# Patient Record
Sex: FEMALE | Race: WHITE | NOT HISPANIC OR LATINO | Employment: OTHER | URBAN - METROPOLITAN AREA
[De-identification: names, ages, dates, MRNs, and addresses within clinical notes are randomized per-mention and may not be internally consistent; named-entity substitution may affect disease eponyms.]

---

## 2022-05-27 ENCOUNTER — OFFICE VISIT (OUTPATIENT)
Dept: MEDICAL GROUP | Facility: MEDICAL CENTER | Age: 67
End: 2022-05-27
Attending: NURSE PRACTITIONER
Payer: MEDICAID

## 2022-05-27 DIAGNOSIS — Z12.11 COLON CANCER SCREENING: ICD-10-CM

## 2022-05-27 DIAGNOSIS — Z13.0 SCREENING FOR ENDOCRINE, NUTRITIONAL, METABOLIC AND IMMUNITY DISORDER: ICD-10-CM

## 2022-05-27 DIAGNOSIS — Z13.228 SCREENING FOR ENDOCRINE, NUTRITIONAL, METABOLIC AND IMMUNITY DISORDER: ICD-10-CM

## 2022-05-27 DIAGNOSIS — Z76.89 ENCOUNTER TO ESTABLISH CARE: ICD-10-CM

## 2022-05-27 DIAGNOSIS — Z12.31 ENCOUNTER FOR SCREENING MAMMOGRAM FOR MALIGNANT NEOPLASM OF BREAST: ICD-10-CM

## 2022-05-27 DIAGNOSIS — Z13.29 SCREENING FOR ENDOCRINE, NUTRITIONAL, METABOLIC AND IMMUNITY DISORDER: ICD-10-CM

## 2022-05-27 DIAGNOSIS — L29.9 ITCHING: ICD-10-CM

## 2022-05-27 DIAGNOSIS — I10 PRIMARY HYPERTENSION: ICD-10-CM

## 2022-05-27 DIAGNOSIS — Z13.21 SCREENING FOR ENDOCRINE, NUTRITIONAL, METABOLIC AND IMMUNITY DISORDER: ICD-10-CM

## 2022-05-27 DIAGNOSIS — F41.9 ANXIETY: ICD-10-CM

## 2022-05-27 PROCEDURE — 99204 OFFICE O/P NEW MOD 45 MIN: CPT | Performed by: NURSE PRACTITIONER

## 2022-05-27 PROCEDURE — 99213 OFFICE O/P EST LOW 20 MIN: CPT | Performed by: NURSE PRACTITIONER

## 2022-05-27 RX ORDER — PAROXETINE HYDROCHLORIDE 20 MG/1
20 TABLET, FILM COATED ORAL DAILY
Qty: 30 TABLET | Refills: 1 | Status: SHIPPED | OUTPATIENT
Start: 2022-05-27 | End: 2022-06-06

## 2022-05-27 RX ORDER — LEVOTHYROXINE SODIUM 0.07 MG/1
37.5 TABLET ORAL
COMMUNITY
End: 2022-06-06 | Stop reason: SDUPTHER

## 2022-05-27 RX ORDER — LISINOPRIL 10 MG/1
20 TABLET ORAL DAILY
Qty: 30 TABLET | Refills: 1 | Status: SHIPPED | OUTPATIENT
Start: 2022-05-27 | End: 2022-06-06

## 2022-05-27 ASSESSMENT — PATIENT HEALTH QUESTIONNAIRE - PHQ9: CLINICAL INTERPRETATION OF PHQ2 SCORE: 0

## 2022-05-27 NOTE — LETTER
Count includes the [facility]  CECILIO Iglesias.  [address]  [place] [address]  Fax: [phone]   Authorization for Release/Disclosure of   Protected Health Information   Name: RITA FIGUEROA : 1955 SSN: [id number]   Address: [address] [place] [id number] Phone:    [phone] (home)    I authorize the entity listed below to release/disclose the PHI below to:   Count includes the [facility]/NESTOR Iglesias and NESTOR Iglesias   Provider or Entity Name:  Dari Bolanos   [facility], [facility], Zip  [address] [place] [address]  Phone:  [phone]    Fax:     Reason for request: continuity of care   Information to be released:    [  ] LAST COLONOSCOPY,  including any PATH REPORT and follow-up  [  ] LAST FIT/COLOGUARD RESULT [  ] LAST DEXA  [  ] LAST MAMMOGRAM  [  ] LAST PAP  [  ] LAST LABS [  ] RETINA EXAM REPORT  [  ] IMMUNIZATION RECORDS  [ xx ] Release all info      [  ] Check here and initial the line next to each item to release ALL health information INCLUDING  _____ Care and treatment for drug and / or alcohol abuse  _____ HIV testing, infection status, or AIDS  _____ Genetic Testing    DATES OF SERVICE OR TIME PERIOD TO BE DISCLOSED: _____________  I understand and acknowledge that:  * This Authorization may be revoked at any time by you in writing, except if your health information has already been used or disclosed.  * Your health information that will be used or disclosed as a result of you signing this authorization could be re-disclosed by the recipient. If this occurs, your re-disclosed health information may no longer be protected by State or Federal laws.  * You may refuse to sign this Authorization. Your refusal will not affect your ability to obtain treatment.  * This Authorization becomes effective upon signing and will  on (date) __________.      If no date is indicated, this Authorization will  one (1) year from the  signature date.    Name: Macarena Walker    Signature:   Date:     5/27/2022       PLEASE FAX REQUESTED RECORDS BACK TO: [phone]

## 2022-05-28 NOTE — ASSESSMENT & PLAN NOTE
Chronic- uncontrolled   Re-starting Lisinopril 20mg po daily  Will have patient keep log at home when her cuff arrives  Will follow up with me in 1 week to recheck blood pressure and see if we need to adjust medications or send to pharmacotherapy.

## 2022-05-28 NOTE — ASSESSMENT & PLAN NOTE
Discussed health history and maintenance   Flu vaccine - Not available   Colon Ca screening - Referral to GI for Colonoscopy  Mammogram- Ordered   Pap smear - Done within last 3 years   STD Screening- Declined   Preventative screening labs ordered - will follow up with me in a few weeks

## 2022-05-28 NOTE — PROGRESS NOTES
"Chief Complaint   Patient presents with   • Establish Care       Subjective:     HPI:   Macarena Walker is a 66 y.o. female here to discuss the evaluation and management of:        Problem   Primary Hypertension    Diagnosed several years ago, was previously on Lisinopril but had been off for a few months and now blood pressure is elevated in clinic. Is having a blood pressure cuff sent to her home.      Encounter to Establish Care    Patient here to establish care. Was previously living in [facility], then moved to [place] for 3 months, and is now here. Has been off her medications for several months and here to re-establish.           ROS  See HPI       Allergies   Allergen Reactions   • Latex Rash     Rash turns into sore if not removed   • Other Misc Rash     \"Cloth band aid's\" Pt states if it isn't removed right away it will turn into a sore.    • Morphine Hives, Rash and Itching     Pt does not know the severity of the reaction due to not being awake during reaction.   • Darvon [Propoxyphene]      Nausea, HA's       Current medicines (including changes today)  Current Outpatient Medications   Medication Sig Dispense Refill   • levothyroxine (SYNTHROID) 75 MCG Tab Take 37.5 mcg by mouth every morning on an empty stomach.     • lisinopril (PRINIVIL) 10 MG Tab Take 2 Tablets by mouth every day. 30 Tablet 1   • PARoxetine (PAXIL) 20 MG Tab Take 1 Tablet by mouth every day. 30 Tablet 1   • nystatin/triamcinolone (MYCOLOG) 830869-8.1 UNIT/GM-% Cream Apply 1 Application topically 3 times a day as needed (itching/ yeast). 30 g 0     No current facility-administered medications for this visit.       Social History     Tobacco Use   • Smoking status: Former Smoker     Years: 20.00     Types: Cigarettes     Quit date: 10/10/1996     Years since quittin.6   • Smokeless tobacco: Never Used   Vaping Use   • Vaping Use: Every day   • Substances: THC, Flavoring   • Devices: Disposable   Substance Use " Topics   • Alcohol use: Yes     Comment: Rarely   • Drug use: Yes     Types: Marijuana, Inhaled, Oral     Comment: Pt states that she uses this for her RA. CS05/27/2022       Patient Active Problem List    Diagnosis Date Noted   • Primary hypertension 05/27/2022   • Encounter to establish care 05/27/2022       Family History   Problem Relation Age of Onset   • Cancer Father    • Cancer Maternal Grandfather           Objective:     There were no vitals taken for this visit. There is no height or weight on file to calculate BMI.    Physical Exam:  Physical Exam  Vitals reviewed.   Constitutional:       General: She is awake.      Appearance: Normal appearance. She is well-developed.   HENT:      Head: Normocephalic.   Eyes:      Conjunctiva/sclera: Conjunctivae normal.   Cardiovascular:      Rate and Rhythm: Normal rate.   Pulmonary:      Effort: Pulmonary effort is normal. No respiratory distress.   Musculoskeletal:      Cervical back: Neck supple.   Skin:     General: Skin is warm and dry.   Neurological:      Mental Status: She is alert and oriented to person, place, and time.   Psychiatric:         Mood and Affect: Mood normal.         Behavior: Behavior normal. Behavior is cooperative.         Assessment and Plan:     The following treatment plan was discussed:    Problem List Items Addressed This Visit     Primary hypertension     Chronic- uncontrolled   Re-starting Lisinopril 20mg po daily  Will have patient keep log at home when her cuff arrives  Will follow up with me in 1 week to recheck blood pressure and see if we need to adjust medications or send to pharmacotherapy.              Relevant Medications    lisinopril (PRINIVIL) 10 MG Tab    Encounter to establish care     Discussed health history and maintenance   Flu vaccine - Not available   Colon Ca screening - Referral to GI for Colonoscopy  Mammogram- Ordered   Pap smear - Done within last 3 years   STD Screening- Declined   Preventative screening labs  ordered - will follow up with me in a few weeks                Other Visit Diagnoses     Screening for endocrine, nutritional, metabolic and immunity disorder        Relevant Orders    Lipid Profile    HEMOGLOBIN A1C    Comp Metabolic Panel    FREE THYROXINE    TSH    VITAMIN D,25 HYDROXY    HEP C VIRUS ANTIBODY    CBC WITH DIFFERENTIAL    VITAMIN B12    FERRITIN    FOLATE    Encounter for screening mammogram for malignant neoplasm of breast        Relevant Orders    MA-SCREENING MAMMO BILAT W/TOMOSYNTHESIS W/CAD    Colon cancer screening        Relevant Orders    Referral to GI for Colonoscopy    Anxiety        Relevant Medications    PARoxetine (PAXIL) 20 MG Tab    Itching        Relevant Medications    nystatin/triamcinolone (MYCOLOG) 747760-6.1 UNIT/GM-% Cream          Any change or worsening of signs or symptoms, patient encouraged to follow-up or report to emergency room for further evaluation. Patient verbalizes understanding and agrees.    Follow-Up: Return in about 1 week (around 6/3/2022).      PLEASE NOTE: This dictation was created using voice recognition software. I have made every reasonable attempt to correct obvious errors, but I expect that there are errors of grammar and possibly content that I did not discover before finalizing the note.

## 2022-06-02 ENCOUNTER — HOSPITAL ENCOUNTER (OUTPATIENT)
Dept: LAB | Facility: MEDICAL CENTER | Age: 67
End: 2022-06-02
Attending: NURSE PRACTITIONER
Payer: MEDICAID

## 2022-06-02 DIAGNOSIS — Z13.0 SCREENING FOR ENDOCRINE, NUTRITIONAL, METABOLIC AND IMMUNITY DISORDER: ICD-10-CM

## 2022-06-02 DIAGNOSIS — Z13.21 SCREENING FOR ENDOCRINE, NUTRITIONAL, METABOLIC AND IMMUNITY DISORDER: ICD-10-CM

## 2022-06-02 DIAGNOSIS — Z13.29 SCREENING FOR ENDOCRINE, NUTRITIONAL, METABOLIC AND IMMUNITY DISORDER: ICD-10-CM

## 2022-06-02 DIAGNOSIS — Z13.228 SCREENING FOR ENDOCRINE, NUTRITIONAL, METABOLIC AND IMMUNITY DISORDER: ICD-10-CM

## 2022-06-02 LAB
25(OH)D3 SERPL-MCNC: 31 NG/ML (ref 30–100)
ALBUMIN SERPL BCP-MCNC: 4.1 G/DL (ref 3.2–4.9)
ALBUMIN/GLOB SERPL: 1.5 G/DL
ALP SERPL-CCNC: 57 U/L (ref 30–99)
ALT SERPL-CCNC: 11 U/L (ref 2–50)
ANION GAP SERPL CALC-SCNC: 11 MMOL/L (ref 7–16)
AST SERPL-CCNC: 7 U/L (ref 12–45)
BASOPHILS # BLD AUTO: 0.7 % (ref 0–1.8)
BASOPHILS # BLD: 0.05 K/UL (ref 0–0.12)
BILIRUB SERPL-MCNC: 0.3 MG/DL (ref 0.1–1.5)
BUN SERPL-MCNC: 13 MG/DL (ref 8–22)
CALCIUM SERPL-MCNC: 9.3 MG/DL (ref 8.5–10.5)
CHLORIDE SERPL-SCNC: 104 MMOL/L (ref 96–112)
CHOLEST SERPL-MCNC: 197 MG/DL (ref 100–199)
CO2 SERPL-SCNC: 25 MMOL/L (ref 20–33)
CREAT SERPL-MCNC: 0.74 MG/DL (ref 0.5–1.4)
EOSINOPHIL # BLD AUTO: 0.2 K/UL (ref 0–0.51)
EOSINOPHIL NFR BLD: 2.7 % (ref 0–6.9)
ERYTHROCYTE [DISTWIDTH] IN BLOOD BY AUTOMATED COUNT: 43.1 FL (ref 35.9–50)
EST. AVERAGE GLUCOSE BLD GHB EST-MCNC: 114 MG/DL
FASTING STATUS PATIENT QL REPORTED: NORMAL
FERRITIN SERPL-MCNC: 36.2 NG/ML (ref 10–291)
FOLATE SERPL-MCNC: 12.1 NG/ML
GFR SERPLBLD CREATININE-BSD FMLA CKD-EPI: 90 ML/MIN/1.73 M 2
GLOBULIN SER CALC-MCNC: 2.7 G/DL (ref 1.9–3.5)
GLUCOSE SERPL-MCNC: 120 MG/DL (ref 65–99)
HBA1C MFR BLD: 5.6 % (ref 4–5.6)
HCT VFR BLD AUTO: 45.8 % (ref 37–47)
HCV AB SER QL: REACTIVE
HDLC SERPL-MCNC: 38 MG/DL
HGB BLD-MCNC: 15.5 G/DL (ref 12–16)
IMM GRANULOCYTES # BLD AUTO: 0.02 K/UL (ref 0–0.11)
IMM GRANULOCYTES NFR BLD AUTO: 0.3 % (ref 0–0.9)
LDLC SERPL CALC-MCNC: 129 MG/DL
LYMPHOCYTES # BLD AUTO: 2.63 K/UL (ref 1–4.8)
LYMPHOCYTES NFR BLD: 35 % (ref 22–41)
MCH RBC QN AUTO: 30.4 PG (ref 27–33)
MCHC RBC AUTO-ENTMCNC: 33.8 G/DL (ref 33.6–35)
MCV RBC AUTO: 89.8 FL (ref 81.4–97.8)
MONOCYTES # BLD AUTO: 0.46 K/UL (ref 0–0.85)
MONOCYTES NFR BLD AUTO: 6.1 % (ref 0–13.4)
NEUTROPHILS # BLD AUTO: 4.16 K/UL (ref 2–7.15)
NEUTROPHILS NFR BLD: 55.2 % (ref 44–72)
NRBC # BLD AUTO: 0 K/UL
NRBC BLD-RTO: 0 /100 WBC
PLATELET # BLD AUTO: 326 K/UL (ref 164–446)
PMV BLD AUTO: 9.4 FL (ref 9–12.9)
POTASSIUM SERPL-SCNC: 4.1 MMOL/L (ref 3.6–5.5)
PROT SERPL-MCNC: 6.8 G/DL (ref 6–8.2)
RBC # BLD AUTO: 5.1 M/UL (ref 4.2–5.4)
SODIUM SERPL-SCNC: 140 MMOL/L (ref 135–145)
T4 FREE SERPL-MCNC: 1.16 NG/DL (ref 0.93–1.7)
TRIGL SERPL-MCNC: 151 MG/DL (ref 0–149)
TSH SERPL DL<=0.005 MIU/L-ACNC: 1.62 UIU/ML (ref 0.38–5.33)
VIT B12 SERPL-MCNC: 511 PG/ML (ref 211–911)
WBC # BLD AUTO: 7.5 K/UL (ref 4.8–10.8)

## 2022-06-02 PROCEDURE — 84439 ASSAY OF FREE THYROXINE: CPT

## 2022-06-02 PROCEDURE — 82306 VITAMIN D 25 HYDROXY: CPT

## 2022-06-02 PROCEDURE — 85025 COMPLETE CBC W/AUTO DIFF WBC: CPT

## 2022-06-02 PROCEDURE — 80061 LIPID PANEL: CPT

## 2022-06-02 PROCEDURE — 83036 HEMOGLOBIN GLYCOSYLATED A1C: CPT

## 2022-06-02 PROCEDURE — 84443 ASSAY THYROID STIM HORMONE: CPT

## 2022-06-02 PROCEDURE — 36415 COLL VENOUS BLD VENIPUNCTURE: CPT

## 2022-06-02 PROCEDURE — 87522 HEPATITIS C REVRS TRNSCRPJ: CPT

## 2022-06-02 PROCEDURE — 82746 ASSAY OF FOLIC ACID SERUM: CPT

## 2022-06-02 PROCEDURE — 80053 COMPREHEN METABOLIC PANEL: CPT

## 2022-06-02 PROCEDURE — 86803 HEPATITIS C AB TEST: CPT

## 2022-06-02 PROCEDURE — 82607 VITAMIN B-12: CPT

## 2022-06-02 PROCEDURE — 82728 ASSAY OF FERRITIN: CPT

## 2022-06-04 LAB
HCV RNA SERPL NAA+PROBE-ACNC: NOT DETECTED IU/ML
HCV RNA SERPL NAA+PROBE-LOG IU: NOT DETECTED LOG IU/ML
HCV RNA SERPL QL NAA+PROBE: NOT DETECTED

## 2022-06-06 ENCOUNTER — OFFICE VISIT (OUTPATIENT)
Dept: MEDICAL GROUP | Facility: MEDICAL CENTER | Age: 67
End: 2022-06-06
Attending: NURSE PRACTITIONER
Payer: MEDICAID

## 2022-06-06 VITALS
HEIGHT: 62 IN | RESPIRATION RATE: 16 BRPM | WEIGHT: 250 LBS | TEMPERATURE: 96.6 F | OXYGEN SATURATION: 95 % | HEART RATE: 68 BPM | BODY MASS INDEX: 46.01 KG/M2 | DIASTOLIC BLOOD PRESSURE: 94 MMHG | SYSTOLIC BLOOD PRESSURE: 148 MMHG

## 2022-06-06 DIAGNOSIS — I10 PRIMARY HYPERTENSION: ICD-10-CM

## 2022-06-06 DIAGNOSIS — L91.8 CUTANEOUS SKIN TAGS: ICD-10-CM

## 2022-06-06 DIAGNOSIS — E03.9 HYPOTHYROIDISM, UNSPECIFIED TYPE: ICD-10-CM

## 2022-06-06 DIAGNOSIS — F41.1 ANXIETY STATE: ICD-10-CM

## 2022-06-06 PROCEDURE — 99213 OFFICE O/P EST LOW 20 MIN: CPT | Mod: 25 | Performed by: NURSE PRACTITIONER

## 2022-06-06 PROCEDURE — 99213 OFFICE O/P EST LOW 20 MIN: CPT | Performed by: NURSE PRACTITIONER

## 2022-06-06 PROCEDURE — 17110 DESTRUCTION B9 LES UP TO 14: CPT | Performed by: NURSE PRACTITIONER

## 2022-06-06 RX ORDER — ESCITALOPRAM OXALATE 10 MG/1
10 TABLET ORAL DAILY
Qty: 30 TABLET | Refills: 1 | Status: SHIPPED | OUTPATIENT
Start: 2022-06-06 | End: 2022-07-19 | Stop reason: SDUPTHER

## 2022-06-06 RX ORDER — LISINOPRIL 30 MG/1
30 TABLET ORAL DAILY
Qty: 30 TABLET | Refills: 1 | Status: SHIPPED | OUTPATIENT
Start: 2022-06-06 | End: 2022-07-19 | Stop reason: SDUPTHER

## 2022-06-06 RX ORDER — LEVOTHYROXINE SODIUM 0.07 MG/1
37.5 TABLET ORAL
Qty: 30 TABLET | Refills: 1 | Status: SHIPPED | OUTPATIENT
Start: 2022-06-06 | End: 2022-07-19 | Stop reason: SDUPTHER

## 2022-06-06 ASSESSMENT — FIBROSIS 4 INDEX: FIB4 SCORE: 0.41

## 2022-06-06 NOTE — PROGRESS NOTES
"Chief Complaint   Patient presents with   • Follow-Up     Blood pressure, Labs       Subjective:     HPI:   Macarena Walker is a 66 y.o. female here to discuss the evaluation and management of:      Problem   Cutaneous Skin Tags    Patient states she has had trouble with skin tags for prolonged amount of time.  Has previously had them removed with dermatology.  Patient has approximately 3 skin tags in the area of her right groin on the upper portion of her thigh that are causing her issues and she would like to see if they can be removed.  Patient states she is okay with cryotherapy as she has had it done previously in the past.     Primary Hypertension    Patient's blood pressure continues to be slightly elevated on 20 mg of lisinopril.  Patient states she has been taking it daily and is about to run out of her medication.      Hypothyroidism    Patient's recent lab work showed that she is stable on her current dose of 37.5 mcg.      Anxiety State    Patient states that her Paxil is no longer working.  Has been on it a few years and seems to not be doing the job anymore.  Patient is interested in switching medications.  Patient's niece was recently started on Lexapro and she feels that may be a better choice for her as it is working well for her niece.  Patient continues to have pretty severe anxiety and had to force herself to even make it to this appointment this morning secondary to her anxiety.  Patient states it is getting harder and harder for her to leave the house and now that she has a new roommate is getting harder to deal with things at home.            ROS  See HPI     Allergies   Allergen Reactions   • Latex Rash     Rash turns into sore if not removed   • Other Misc Rash     \"Cloth band aid's\" Pt states if it isn't removed right away it will turn into a sore.    • Morphine Hives, Rash and Itching     Pt does not know the severity of the reaction due to not being awake during reaction.   • Darvon " [Propoxyphene]      Nausea, HA's       Current medicines (including changes today)  Current Outpatient Medications   Medication Sig Dispense Refill   • escitalopram (LEXAPRO) 10 MG Tab Take 1 Tablet by mouth every day. 30 Tablet 1   • lisinopril (PRINIVIL) 30 MG tablet Take 1 Tablet by mouth every day. 30 Tablet 1   • levothyroxine (SYNTHROID) 75 MCG Tab Take 0.5 Tablets by mouth every morning on an empty stomach. 30 Tablet 1   • nystatin/triamcinolone (MYCOLOG) 215734-6.1 UNIT/GM-% Cream Apply 1 Application topically 3 times a day as needed (itching/ yeast). 30 g 0     No current facility-administered medications for this visit.       Social History     Tobacco Use   • Smoking status: Former Smoker     Years: 20.00     Types: Cigarettes     Quit date: 10/10/1996     Years since quittin.6   • Smokeless tobacco: Never Used   Vaping Use   • Vaping Use: Every day   • Substances: THC, Flavoring   • Devices: Disposable   Substance Use Topics   • Alcohol use: Yes     Comment: Rarely   • Drug use: Yes     Types: Marijuana, Inhaled, Oral     Comment: Pt states that she uses this for her RA. CS2022       Patient Active Problem List    Diagnosis Date Noted   • Cutaneous skin tags 2022   • Primary hypertension 2022   • Encounter to establish care 2022   • Marijuana use 2016   • Hepatitis C infection 2015   • Hypothyroidism 2015   • Carpal tunnel syndrome 10/29/2014   • Elevated liver enzymes 10/01/2014   • Peripheral neuropathy 2014   • Candida infection 2014   • Anxiety state 2014   • Rheumatoid arthritis (HCC) 2014   • Hepatitis C virus carrier state (HCC) 2014   • Hyperlipidemia 2014   • Impaired fasting glucose 2014   • Obesity 2014       Family History   Problem Relation Age of Onset   • Cancer Father    • Cancer Maternal Grandfather           Objective:     BP (!) 148/94 (BP Location: Left arm, Patient Position: Sitting, BP  "Cuff Size: Adult)   Pulse 68   Temp 35.9 °C (96.6 °F) (Temporal)   Resp 16   Ht 1.575 m (5' 2\")   Wt 113 kg (250 lb)   SpO2 95%  Body mass index is 45.73 kg/m².    Physical Exam:  Physical Exam  Vitals reviewed.   Constitutional:       General: She is awake.      Appearance: Normal appearance. She is well-developed. She is obese.   HENT:      Head: Normocephalic.   Eyes:      Conjunctiva/sclera: Conjunctivae normal.   Cardiovascular:      Rate and Rhythm: Normal rate.   Pulmonary:      Effort: Pulmonary effort is normal. No respiratory distress.   Genitourinary:         Comments: Three skin tags treated with Cryotherapy   Musculoskeletal:      Cervical back: Neck supple.   Skin:     General: Skin is warm and dry.   Neurological:      Mental Status: She is alert and oriented to person, place, and time.   Psychiatric:         Mood and Affect: Mood normal.         Behavior: Behavior normal. Behavior is cooperative.              Assessment and Plan:     The following treatment plan was discussed:    Problem List Items Addressed This Visit     Primary hypertension     Ongoing uncontrolled-  Will increase lisinopril to 30 mg daily  Encourage patient to take blood pressure at home which she has a cuff.  We will follow-up with me at her next appointment to adjust medication as needed           Relevant Medications    lisinopril (PRINIVIL) 30 MG tablet    Anxiety state     Chronic, uncontrolled-  Will discontinue Paxil  Initiate Lexapro 10 mg tabs, discussed with patient potential side effects especially in the first few weeks and to allow the medication 6 weeks to be therapeutic.  Patient will follow up with me in 6 weeks to see if dose adjustment is needed.  Referral to psychiatry also placed to help with coping mechanisms to help deal with her anxiety.           Relevant Medications    escitalopram (LEXAPRO) 10 MG Tab    Other Relevant Orders    Referral to Psychiatry    Hypothyroidism     Chronic, controlled-  Will " continue with 37.5 mcg each morning, refill of her medication sent to pharmacy           Relevant Medications    levothyroxine (SYNTHROID) 75 MCG Tab    Cutaneous skin tags     Chronic, uncontrolled-  3 separate areas treated with cryotherapy x3 passes  4 x 4 and Tegaderm placed over to avoid rubbing and irritation  Explained to patient that it may take more than 1 session to have skin tags fully removed  Can plan for removal of skin tags in office if cryotherapy is not effective.  Referral to dermatology placed as patient has another, lump that appears to be a lipoma on her opposite side.           Relevant Orders    Referral to Dermatology          Any change or worsening of signs or symptoms, patient encouraged to follow-up or report to emergency room for further evaluation. Patient verbalizes understanding and agrees.    Follow-Up: Return in about 6 weeks (around 7/18/2022).      PLEASE NOTE: This dictation was created using voice recognition software. I have made every reasonable attempt to correct obvious errors, but I expect that there are errors of grammar and possibly content that I did not discover before finalizing the note.

## 2022-06-06 NOTE — ASSESSMENT & PLAN NOTE
Chronic, controlled-  Will continue with 37.5 mcg each morning, refill of her medication sent to pharmacy

## 2022-06-06 NOTE — ASSESSMENT & PLAN NOTE
Chronic, uncontrolled-  Will discontinue Paxil  Initiate Lexapro 10 mg tabs, discussed with patient potential side effects especially in the first few weeks and to allow the medication 6 weeks to be therapeutic.  Patient will follow up with me in 6 weeks to see if dose adjustment is needed.  Referral to psychiatry also placed to help with coping mechanisms to help deal with her anxiety.

## 2022-06-06 NOTE — ASSESSMENT & PLAN NOTE
Ongoing uncontrolled-  Will increase lisinopril to 30 mg daily  Encourage patient to take blood pressure at home which she has a cuff.  We will follow-up with me at her next appointment to adjust medication as needed

## 2022-06-06 NOTE — ASSESSMENT & PLAN NOTE
Chronic, uncontrolled-  3 separate areas treated with cryotherapy x3 passes  4 x 4 and Tegaderm placed over to avoid rubbing and irritation  Explained to patient that it may take more than 1 session to have skin tags fully removed  Can plan for removal of skin tags in office if cryotherapy is not effective.  Referral to dermatology placed as patient has another, lump that appears to be a lipoma on her opposite side.

## 2022-06-27 ENCOUNTER — HOSPITAL ENCOUNTER (OUTPATIENT)
Dept: RADIOLOGY | Facility: MEDICAL CENTER | Age: 67
End: 2022-06-27
Attending: NURSE PRACTITIONER
Payer: MEDICAID

## 2022-06-27 DIAGNOSIS — Z12.31 ENCOUNTER FOR SCREENING MAMMOGRAM FOR MALIGNANT NEOPLASM OF BREAST: ICD-10-CM

## 2022-06-27 PROCEDURE — 77063 BREAST TOMOSYNTHESIS BI: CPT

## 2022-07-08 DIAGNOSIS — L29.9 ITCHING: ICD-10-CM

## 2022-07-14 ENCOUNTER — HOSPITAL ENCOUNTER (OUTPATIENT)
Dept: RADIOLOGY | Facility: MEDICAL CENTER | Age: 67
End: 2022-07-14
Payer: MEDICAID

## 2022-07-19 DIAGNOSIS — E03.9 HYPOTHYROIDISM, UNSPECIFIED TYPE: ICD-10-CM

## 2022-07-19 DIAGNOSIS — I10 PRIMARY HYPERTENSION: ICD-10-CM

## 2022-07-19 DIAGNOSIS — F41.1 ANXIETY STATE: ICD-10-CM

## 2022-07-19 RX ORDER — LISINOPRIL 30 MG/1
30 TABLET ORAL DAILY
Qty: 30 TABLET | Refills: 1 | Status: SHIPPED | OUTPATIENT
Start: 2022-07-19 | End: 2022-10-10 | Stop reason: SDUPTHER

## 2022-07-19 RX ORDER — LEVOTHYROXINE SODIUM 0.07 MG/1
37.5 TABLET ORAL
Qty: 30 TABLET | Refills: 1 | Status: SHIPPED | OUTPATIENT
Start: 2022-07-19

## 2022-07-19 RX ORDER — ESCITALOPRAM OXALATE 10 MG/1
10 TABLET ORAL DAILY
Qty: 30 TABLET | Refills: 1 | Status: SHIPPED | OUTPATIENT
Start: 2022-07-19

## 2022-08-17 ENCOUNTER — OFFICE VISIT (OUTPATIENT)
Dept: MEDICAL GROUP | Facility: MEDICAL CENTER | Age: 67
End: 2022-08-17
Attending: NURSE PRACTITIONER
Payer: MEDICAID

## 2022-08-17 DIAGNOSIS — L30.9 ECZEMA, UNSPECIFIED TYPE: ICD-10-CM

## 2022-08-17 DIAGNOSIS — F41.1 ANXIETY STATE: ICD-10-CM

## 2022-08-17 PROCEDURE — 99214 OFFICE O/P EST MOD 30 MIN: CPT | Performed by: NURSE PRACTITIONER

## 2022-08-17 PROCEDURE — 99213 OFFICE O/P EST LOW 20 MIN: CPT | Performed by: NURSE PRACTITIONER

## 2022-08-17 RX ORDER — CLOBETASOL PROPIONATE 0.5 MG/G
1 CREAM TOPICAL 2 TIMES DAILY
Qty: 45 G | Refills: 0 | Status: SHIPPED | OUTPATIENT
Start: 2022-08-17

## 2022-08-17 NOTE — PROGRESS NOTES
"No chief complaint on file.      Subjective:     HPI:   Macarena Walker is a 66 y.o. female here to discuss the evaluation and management of:      Problem   Anxiety State    Patient states she is doing better. Has been leaving the house more and doing some as needed work going to destinations and taking pictures. She has established with behavioral health program and has appointment to see Psychiatrist in the next 2 weeks. Patient is continuing to have some problems with her roommate but overall doing well. States Lexapro seems to be working but wants to hold off on any changes until she sees psychiatrist.            ROS  See HPI     Allergies   Allergen Reactions    Latex Rash     Rash turns into sore if not removed    Other Misc Rash     \"Cloth band aid's\" Pt states if it isn't removed right away it will turn into a sore.     Morphine Hives, Rash and Itching     Pt does not know the severity of the reaction due to not being awake during reaction.    Darvon [Propoxyphene]      Nausea, HA's       Current medicines (including changes today)  Current Outpatient Medications   Medication Sig Dispense Refill    clobetasol (TEMOVATE) 0.05 % Cream Apply 1 Application topically 2 times a day. 45 g 0    escitalopram (LEXAPRO) 10 MG Tab Take 1 Tablet by mouth every day. 30 Tablet 1    levothyroxine (SYNTHROID) 75 MCG Tab Take 0.5 Tablets by mouth every morning on an empty stomach. 30 Tablet 1    lisinopril (PRINIVIL) 30 MG tablet Take 1 Tablet by mouth every day. 30 Tablet 1    nystatin/triamcinolone (MYCOLOG) 612082-1.1 UNIT/GM-% Cream APPLY TOPICALLY TO THE AFFECTED AREA THREE TIMES DAILY AS NEEDED FOR ITCHING OR YEAST 30 g 0     No current facility-administered medications for this visit.       Social History     Tobacco Use    Smoking status: Former     Years: 20.00     Types: Cigarettes     Quit date: 10/10/1996     Years since quittin.8    Smokeless tobacco: Never   Vaping Use    Vaping Use: Every day    " Substances: THC, Flavoring    Devices: Disposable   Substance Use Topics    Alcohol use: Yes     Comment: Rarely    Drug use: Yes     Types: Marijuana, Inhaled, Oral     Comment: Pt states that she uses this for her RA. CS05/27/2022       Patient Active Problem List    Diagnosis Date Noted    Cutaneous skin tags 06/06/2022    Primary hypertension 05/27/2022    Encounter to establish care 05/27/2022    Marijuana use 01/05/2016    Hepatitis C infection 01/13/2015    Hypothyroidism 01/13/2015    Carpal tunnel syndrome 10/29/2014    Elevated liver enzymes 10/01/2014    Peripheral neuropathy 08/27/2014    Candida infection 07/17/2014    Anxiety state 05/03/2014    Rheumatoid arthritis (HCC) 05/03/2014    Hepatitis C virus carrier state (HCC) 05/03/2014    Hyperlipidemia 05/03/2014    Impaired fasting glucose 05/03/2014    Obesity 05/03/2014       Family History   Problem Relation Age of Onset    Cancer Father     Cancer Maternal Grandfather           Objective:     There were no vitals taken for this visit. There is no height or weight on file to calculate BMI.    Physical Exam:  Physical Exam  Vitals reviewed.   Constitutional:       General: She is awake.      Appearance: Normal appearance. She is well-developed. She is obese.   HENT:      Head: Normocephalic.   Eyes:      Pupils: Pupils are equal, round, and reactive to light.   Cardiovascular:      Rate and Rhythm: Normal rate.   Pulmonary:      Effort: Pulmonary effort is normal. No respiratory distress.   Musculoskeletal:      Cervical back: Neck supple.   Skin:     General: Skin is warm and dry.   Neurological:      Mental Status: She is alert and oriented to person, place, and time.   Psychiatric:         Mood and Affect: Mood normal.         Behavior: Behavior normal. Behavior is cooperative.            Assessment and Plan:     The following treatment plan was discussed:    Problem List Items Addressed This Visit       Anxiety state     Chronic-   Improving-  leaving house more  Continue to follow with Psychiatry and plans to start CBT.   Will continue with Lexapro at 10mg.           Other Visit Diagnoses       Eczema, unspecified type        Relevant Medications    clobetasol (TEMOVATE) 0.05 % Cream            Any change or worsening of signs or symptoms, patient encouraged to follow-up or report to emergency room for further evaluation. Patient verbalizes understanding and agrees.    Follow-Up: Return in about 3 months (around 11/17/2022) for Follow up Labs, Follow up depression/anxiety.      PLEASE NOTE: This dictation was created using voice recognition software. I have made every reasonable attempt to correct obvious errors, but I expect that there are errors of grammar and possibly content that I did not discover before finalizing the note.

## 2022-08-17 NOTE — ASSESSMENT & PLAN NOTE
Chronic-   Improving- leaving house more  Continue to follow with Psychiatry and plans to start CBT.   Will continue with Lexapro at 10mg.

## 2022-10-10 DIAGNOSIS — I10 PRIMARY HYPERTENSION: ICD-10-CM

## 2022-10-10 RX ORDER — LISINOPRIL 30 MG/1
30 TABLET ORAL DAILY
Qty: 30 TABLET | Refills: 1 | Status: SHIPPED | OUTPATIENT
Start: 2022-10-10 | End: 2023-02-06 | Stop reason: SDUPTHER

## 2023-02-06 DIAGNOSIS — I10 PRIMARY HYPERTENSION: ICD-10-CM

## 2023-02-07 RX ORDER — LISINOPRIL 30 MG/1
30 TABLET ORAL DAILY
Qty: 30 TABLET | Refills: 1 | Status: SHIPPED | OUTPATIENT
Start: 2023-02-07